# Patient Record
Sex: MALE | Race: WHITE | Employment: FULL TIME | URBAN - METROPOLITAN AREA
[De-identification: names, ages, dates, MRNs, and addresses within clinical notes are randomized per-mention and may not be internally consistent; named-entity substitution may affect disease eponyms.]

---

## 2019-04-19 VITALS
TEMPERATURE: 97.5 F | SYSTOLIC BLOOD PRESSURE: 115 MMHG | HEIGHT: 65 IN | RESPIRATION RATE: 18 BRPM | HEART RATE: 92 BPM | BODY MASS INDEX: 24.16 KG/M2 | WEIGHT: 145 LBS | DIASTOLIC BLOOD PRESSURE: 71 MMHG | OXYGEN SATURATION: 97 %

## 2019-04-20 ENCOUNTER — HOSPITAL ENCOUNTER (EMERGENCY)
Age: 24
Discharge: HOME | End: 2019-04-20

## 2019-04-20 ENCOUNTER — APPOINTMENT (OUTPATIENT)
Dept: CT IMAGING | Age: 24
End: 2019-04-20

## 2019-04-20 DIAGNOSIS — S00.83XA FACIAL CONTUSION, INITIAL ENCOUNTER: Primary | ICD-10-CM

## 2019-04-20 PROCEDURE — 70486 CT MAXILLOFACIAL W/O DYE: CPT

## 2019-04-20 PROCEDURE — 99283 EMERGENCY DEPT VISIT LOW MDM: CPT

## 2019-04-20 NOTE — ED NOTES
Skin abrasion to left lower arm cleaned and dressed.  Pt taken to [facility], RN  04/20/19 5017

## 2019-04-20 NOTE — ED PROVIDER NOTES
good      NOTE: This report was transcribed using voice recognition software. Every effort was made to ensure accuracy; however, inadvertent computerized transcription errors may be present     Serg Pelaez  04/20/19 0146    ATTENDING PROVIDER ATTESTATION:     Supervising Physician, on-site, available for consultation, non-participatory in the evaluation or care of this patient.          [address] [facility],   04/20/19 8029

## 2019-06-05 ENCOUNTER — HOSPITAL ENCOUNTER (EMERGENCY)
Age: 24
Discharge: HOME | End: 2019-06-05

## 2019-06-05 ENCOUNTER — APPOINTMENT (OUTPATIENT)
Dept: GENERAL RADIOLOGY | Age: 24
End: 2019-06-05

## 2019-06-05 VITALS
SYSTOLIC BLOOD PRESSURE: 121 MMHG | TEMPERATURE: 98.5 F | BODY MASS INDEX: 23.3 KG/M2 | HEIGHT: 66 IN | HEART RATE: 84 BPM | DIASTOLIC BLOOD PRESSURE: 70 MMHG | RESPIRATION RATE: 16 BRPM | OXYGEN SATURATION: 100 % | WEIGHT: 145 LBS

## 2019-06-05 DIAGNOSIS — S39.012A STRAIN OF LUMBAR REGION, INITIAL ENCOUNTER: Primary | ICD-10-CM

## 2019-06-05 PROCEDURE — 72100 X-RAY EXAM L-S SPINE 2/3 VWS: CPT

## 2019-06-05 PROCEDURE — 99283 EMERGENCY DEPT VISIT LOW MDM: CPT

## 2019-06-05 RX ORDER — CYCLOBENZAPRINE HCL 10 MG
10 TABLET ORAL 3 TIMES DAILY PRN
Qty: 10 TABLET | Refills: 0 | Status: SHIPPED | OUTPATIENT
Start: 2019-06-05

## 2019-06-05 RX ORDER — NAPROXEN 500 MG/1
500 TABLET ORAL 2 TIMES DAILY
Qty: 14 TABLET | Refills: 0 | Status: SHIPPED | OUTPATIENT
Start: 2019-06-05 | End: 2019-06-12

## 2019-06-05 ASSESSMENT — PAIN SCALES - GENERAL: PAINLEVEL_OUTOF10: 8

## 2019-06-05 NOTE — ED PROVIDER NOTES
[facility]     Department of Emergency Medicine   ED  Provider Note  Admit Date/RoomTime: 6/5/2019 10:46 AM  ED Room: 19/19  Chief Complaint:   Back Pain (CHRONIC PAIN SINCE 2012 WHEN HE FX HIS BACK NOW WORSE OVER THE LAST 50 HOURS)    History of Present Illness   Source of history provided by:  patient. History/Exam Limitations: none. Carly Wood is a 20 y.o. old male who has a past medical history of: History reviewed. No pertinent past medical history. presents to the emergency department by private vehicle, for acute on chronic, aching right greater than left lower lumbar spine pain without radiation, for 2 day(s) prior to arrival.  Patient states that he has had chronic back pain since 2012 when he had an accident and had a fractured his lumbar spine. He denies any surgical procedures following this. He states that his pain has been worse over the past 2 days. Denies any recent fall or injury. Reports that it is worse with movement, nothing makes it feel better. He denies any loss of control of his bowels or bladder, no numbness in the groin area. He denies any history of IV drug abuse. No fevers or burning with urination. He's been taking ibuprofen intermittently for his pain which does seem to help. ROS   Pertinent positives and negatives are stated within HPI, all other systems reviewed and are negative. Past Surgical History:   Procedure Laterality Date    ANTERIOR CRUCIATE LIGAMENT REPAIR      KNEE SURGERY     Social History:  reports that he has been smoking cigarettes. He has been smoking about 1.00 pack per day. He has never used smokeless tobacco. He reports that he does not drink alcohol or use drugs. Family History: family history is not on file.   Allergies: Amoxicillin and Pcn [penicillins]    Physical Exam           ED Triage Vitals [06/05/19 1042]   BP Temp Temp Source Pulse Resp SpO2 Height Weight   121/70 98.5 °F (36.9 °C) Oral 84 16 100 % 5' 6\" (1.676 m) 145 lb fevers, chills, urinary symptoms or history of IV drug abuse. Likely musculoskeletal pain. We'll discharge him at this time. Patient is counseled on the medications that can make him drowsy and advised not to operate heavy machinery or drive. Patient is encouraged to return to the ER for any worsening symptoms. Otherwise to follow-up with PCP. Counseling: The emergency provider has spoken with the patient and discussed todays results, in addition to providing specific details for the plan of care and counseling regarding the diagnosis and prognosis. Questions are answered at this time and they are agreeable with the plan. Assessment      1. Strain of lumbar region, initial encounter      Plan   Discharge to home  Patient condition is good    New Medications     New Prescriptions    CYCLOBENZAPRINE (FLEXERIL) 10 MG TABLET    Take 1 tablet by mouth 3 times daily as needed for Muscle spasms    NAPROXEN (NAPROSYN) 500 MG TABLET    Take 1 tablet by mouth 2 times daily for 7 days     Electronically signed by DARRIAN Cervantes   DD: 6/5/19  **This report was transcribed using voice recognition software. Every effort was made to ensure accuracy; however, inadvertent computerized transcription errors may be present.   END OF ED PROVIDER NOTE       Serg Cervantes  06/05/19 1158